# Patient Record
Sex: FEMALE | Race: WHITE | NOT HISPANIC OR LATINO | Employment: UNEMPLOYED | URBAN - METROPOLITAN AREA
[De-identification: names, ages, dates, MRNs, and addresses within clinical notes are randomized per-mention and may not be internally consistent; named-entity substitution may affect disease eponyms.]

---

## 2018-08-03 DIAGNOSIS — M25.511 RIGHT SHOULDER PAIN, UNSPECIFIED CHRONICITY: Primary | ICD-10-CM

## 2018-08-06 ENCOUNTER — HOSPITAL ENCOUNTER (OUTPATIENT)
Dept: RADIOLOGY | Facility: HOSPITAL | Age: 43
Discharge: HOME | End: 2018-08-06
Attending: ORTHOPAEDIC SURGERY
Payer: COMMERCIAL

## 2018-08-06 ENCOUNTER — OFFICE VISIT (OUTPATIENT)
Dept: ORTHOPEDICS | Facility: CLINIC | Age: 43
End: 2018-08-06
Payer: COMMERCIAL

## 2018-08-06 VITALS
HEART RATE: 71 BPM | BODY MASS INDEX: 25.83 KG/M2 | HEIGHT: 65 IN | SYSTOLIC BLOOD PRESSURE: 131 MMHG | WEIGHT: 155 LBS | DIASTOLIC BLOOD PRESSURE: 78 MMHG

## 2018-08-06 DIAGNOSIS — M25.511 RIGHT SHOULDER PAIN, UNSPECIFIED CHRONICITY: ICD-10-CM

## 2018-08-06 DIAGNOSIS — S46.211A BICEPS TENDON RUPTURE, RIGHT, INITIAL ENCOUNTER: ICD-10-CM

## 2018-08-06 DIAGNOSIS — M19.011 ARTHRITIS OF RIGHT ACROMIOCLAVICULAR JOINT: ICD-10-CM

## 2018-08-06 DIAGNOSIS — M75.111 INCOMPLETE TEAR OF RIGHT ROTATOR CUFF: Primary | ICD-10-CM

## 2018-08-06 PROBLEM — M19.019 AC (ACROMIOCLAVICULAR) JOINT ARTHRITIS: Status: ACTIVE | Noted: 2018-08-06

## 2018-08-06 PROCEDURE — 20610 DRAIN/INJ JOINT/BURSA W/O US: CPT | Mod: RT,S$GLB,, | Performed by: ORTHOPAEDIC SURGERY

## 2018-08-06 PROCEDURE — 99999 PR PBB SHADOW E&M-EST. PATIENT-LVL III: CPT | Mod: 25,PBBFAC,, | Performed by: ORTHOPAEDIC SURGERY

## 2018-08-06 PROCEDURE — 3008F BODY MASS INDEX DOCD: CPT | Mod: CPTII,S$GLB,, | Performed by: ORTHOPAEDIC SURGERY

## 2018-08-06 PROCEDURE — 73030 X-RAY EXAM OF SHOULDER: CPT | Mod: TC,FY,RT

## 2018-08-06 PROCEDURE — 99204 OFFICE O/P NEW MOD 45 MIN: CPT | Mod: 25,S$GLB,, | Performed by: ORTHOPAEDIC SURGERY

## 2018-08-06 PROCEDURE — 73030 X-RAY EXAM OF SHOULDER: CPT | Mod: 26,RT,, | Performed by: RADIOLOGY

## 2018-08-06 RX ORDER — TRIAMCINOLONE ACETONIDE 40 MG/ML
40 INJECTION, SUSPENSION INTRA-ARTICULAR; INTRAMUSCULAR
Status: DISCONTINUED | OUTPATIENT
Start: 2018-08-06 | End: 2018-08-06 | Stop reason: HOSPADM

## 2018-08-06 RX ADMIN — TRIAMCINOLONE ACETONIDE 40 MG: 40 INJECTION, SUSPENSION INTRA-ARTICULAR; INTRAMUSCULAR at 04:08

## 2018-08-06 NOTE — PROGRESS NOTES
"  Subjective:      Patient ID: Juan Carlos Diez is a 42 y.o. female.    Chief Complaint: Pain of the Right Shoulder    Referring Provider: No referring provider defined for this encounter.     HPI:  Ms. Diez is a 42-year-old right-hand-dominant female who presented today for evaluation of 1 year of right shoulder pain increasing in intensity over the last 6 months which began after she was mountain biking and hit a pothole flipping over the handlebars landing on her right shoulder.  She is uncertain of the exact date of injury.  Overhead activity and lifting items increase her pain. Rest improves it. She stated, my strength is not as good in my right shoulder as my left."  Her symptoms awaken her at night.  She has taken NSAIDs without help.  She has not done physical therapy nor had injections.    Past Medical History:   Diagnosis Date    Back injury      *  *  *  *  * L5  Anxiety  GERD  IBS  Headaches  Past history of pain management currently not attended     Past Surgical History:   Procedure Laterality Date     * TUBAL LIGATION  Ectopic pregnancy       Review of patient's allergies indicates:  No Known Allergies    Social History     Occupational History    Housekeeping     Social History Main Topics    Smoking status: Current Every Day Smoker     Packs/day: 1.00    Smokeless tobacco: Not on file    Alcohol use Yes    Drug use: No    Sexual activity: Yes     Birth control/ protection: Surgical      Family History   Problem Relation Age of Onset    Arthritis Mother     Heart disease Father     Hypertension Father     No Known Problems Sister     Ulcers Brother     Arthritis Sister     No Known Problems Sister     Brain cancer Sister     No Known Problems Brother     No Known Problems Brother      Previous Hospitalizations:  Childbirth, ectopic pregnancy.    Review of Systems   Constitution: Negative for chills and fever.   HENT: Negative for hearing loss and sore throat.    Eyes: Negative for " blurred vision.   Cardiovascular: Negative for chest pain.   Respiratory: Negative for shortness of breath.    Endocrine: Negative for polydipsia.   Hematologic/Lymphatic: Does not bruise/bleed easily.   Skin: Negative for poor wound healing.   Musculoskeletal: Positive for joint pain.   Gastrointestinal: Negative for constipation and diarrhea.   Genitourinary: Negative for flank pain.   Neurological: Negative for numbness.   Psychiatric/Behavioral: The patient is nervous/anxious.    Allergic/Immunologic: Negative for environmental allergies.          Objective:      Physical Exam:  General: AAOx3.  No acute distress  HEENT: Normocephalic, PEARLA EOMI, Fair Dentition  Neck: Supple, No JVD  Chest: Symetric, equal excursion on inspiration  Abdomen: Soft NTND  Vascular:  Pulses intact and equal bilaterally.  Capillary refill less than 3 seconds and equal bilaterally  Neurologic:  Pinprick and soft touch intact and equal bilaterally  Integment:  No ecchymosis, no errythema  Extremity:  Shoulder:  Forward flexion/abduction equal bilaterally 0/175°.  Internal rotation right shoulder L5, left shoulder T9.  Negative lift-off bilaterally. External rotation equal bilaterally 0/25°.  Tender with motion right shoulder.  Full can negative both shoulders.  Empty can negative both shoulders.  Hedrick/Neer positive right shoulder.  Cross-arm positive right shoulder.  Mild elevation AC joint right shoulder.  Tender with palpation AC joint right shoulder.  Tender in the bicipital groove right shoulder.  Yergason's negative both shoulders.  Apprehension/relocation negative both shoulders.  Positive distal bre sign right shoulder.   Radiography:  Personally  reviewed x-rays of the right shoulder completed on 08/06/2018 which showed a type 1/2 AC separation, type 2 acromion, no fracture dislocation.      Assessment:       Impression:     1. Incomplete rotator cuff tear versus impingement, right shoulder.   2. Biceps tendon rupture,  right shoulder, initial encounter    3. AC arthritis/chronic AC separation, right shoulder.         Plan:       1.  Discussed physical examination and radiographic findings with the patient. Juan Carlos understands that she has multiple issues present in her right shoulder but she may improve with conservative management and recommend she trial more conservative care before proceeding with surgery. She understands if she fails conservative care she can consider surgical intervention in the future.    2.  Offered a steroid injection to the right shoulder, she elected to proceed.  3.  Offered referred to physical therapy, declined for now as she may proceed with surgery in the future and would prefer to save it for postoperative.  4.  Home exercises to include Codman exercises, wall walking exercises, towel exercises, and cane exercises were shown discussed with the patient.   5.  NSAID use was discussed with the patient.  6.  May use right upper extremity as tolerated but avoid inciting activities.  7.  Follow up p.r.n..  If the patient has not improved by next visit we will discuss possible surgical intervention and refer for an MRI of her right shoulder.

## 2018-08-06 NOTE — PROCEDURES
Large Joint Aspiration/Injection  Date/Time: 8/6/2018 4:12 PM  Performed by: VALERI ESCOBAR  Authorized by: VALERI ESCOBAR     Consent Done?:  Yes (Verbal)  Indications:  Pain and diagnostic evaluation  Procedure site marked: Yes    Timeout: Prior to procedure the correct patient, procedure, and site was verified      Location:  Shoulder  Site:  R glenohumeral  Prep: Patient was prepped and draped in usual sterile fashion    Ultrasonic Guidance for needle placement: No  Needle size:  22 G  Approach:  Posterior  Medications:  40 mg triamcinolone acetonide 40 mg/mL  Patient tolerance:  Patient tolerated the procedure well with no immediate complications

## 2019-01-28 ENCOUNTER — OFFICE VISIT (OUTPATIENT)
Dept: ORTHOPEDICS | Facility: CLINIC | Age: 44
End: 2019-01-28
Payer: COMMERCIAL

## 2019-01-28 VITALS
HEIGHT: 65 IN | BODY MASS INDEX: 25.83 KG/M2 | SYSTOLIC BLOOD PRESSURE: 130 MMHG | DIASTOLIC BLOOD PRESSURE: 75 MMHG | HEART RATE: 80 BPM | WEIGHT: 155 LBS

## 2019-01-28 DIAGNOSIS — M19.011 ARTHRITIS OF RIGHT ACROMIOCLAVICULAR JOINT: ICD-10-CM

## 2019-01-28 DIAGNOSIS — M75.101 TEAR OF RIGHT ROTATOR CUFF, UNSPECIFIED TEAR EXTENT: Primary | ICD-10-CM

## 2019-01-28 DIAGNOSIS — S46.211D BICEPS TENDON RUPTURE, RIGHT, SUBSEQUENT ENCOUNTER: ICD-10-CM

## 2019-01-28 DIAGNOSIS — M75.111 INCOMPLETE RUPTURE OF RIGHT ROTATOR CUFF: Primary | ICD-10-CM

## 2019-01-28 PROCEDURE — 99999 PR PBB SHADOW E&M-EST. PATIENT-LVL III: ICD-10-PCS | Mod: PBBFAC,,, | Performed by: ORTHOPAEDIC SURGERY

## 2019-01-28 PROCEDURE — 99999 PR PBB SHADOW E&M-EST. PATIENT-LVL III: CPT | Mod: PBBFAC,,, | Performed by: ORTHOPAEDIC SURGERY

## 2019-01-28 PROCEDURE — 99214 PR OFFICE/OUTPT VISIT, EST, LEVL IV, 30-39 MIN: ICD-10-PCS | Mod: S$PBB,,, | Performed by: ORTHOPAEDIC SURGERY

## 2019-01-28 PROCEDURE — 99214 OFFICE O/P EST MOD 30 MIN: CPT | Mod: S$PBB,,, | Performed by: ORTHOPAEDIC SURGERY

## 2019-01-28 NOTE — PROGRESS NOTES
Subjective:      Patient ID: Juan Carlos Diez is a 43 y.o. female.    Chief Complaint: Pain of the Right Shoulder    HPI:  Ms. Diez returns today with complaints of persistent pain in her right shoulder.  She was last seen on 08/06/2018 and was given a steroid injection in her shoulder which she stated did not help.  She stated she would have come sooner but she was having trouble with transportation.  She now has transportation to get tip area she would like.  Forward flexion and abduction along with lifting increase her symptoms. Overhead activities increase her symptoms. Her symptoms still awaken her at night.  She has taken NSAIDs without help.  She has not done recent physical therapy.  The steroid injection given at her last visit did not help.  She originally injured her shoulder in 2016 after she hit a pothole while mountain biking and fell onto her right shoulder.    ROS:  No new diagnosis/surgery/prescriptions since last office visit on 08/06/2018.  Constitution: Negative for chills and fever.   HENT: Negative for hearing loss and sore throat.    Eyes: Negative for blurred vision.   Cardiovascular: Negative for chest pain.   Respiratory: Negative for shortness of breath.    Endocrine: Negative for polydipsia.   Hematologic/Lymphatic: Does not bruise/bleed easily.   Skin: Negative for poor wound healing.   Musculoskeletal: Positive for joint pain.   Gastrointestinal: Negative for constipation and diarrhea.   Genitourinary: Negative for flank pain.   Neurological: Negative for numbness.   Psychiatric/Behavioral: The patient is nervous/anxious.    Allergic/Immunologic: Negative for environmental allergies.       Objective:      Physical Exam:   General: AAOx3.  No acute distress  Vascular:  Pulses intact and equal bilaterally.  Capillary refill less than 3 seconds and equal bilaterally  Neurologic:  Pinprick and soft touch intact and equal bilaterally. Spurling's negative.  Integment:  No ecchymosis, no  errythema  Extremity:  Shoulder:  Forward flexion/abduction equal bilaterally 0/175°.  Internal rotation right shoulder L5, left shoulder T9.  Negative lift-off bilaterally. External rotation equal bilaterally 0/25°. Tender with motion right shoulder.  Full can negative both shoulders.  Empty can negative both shoulders.  Hedrick/Neer positive right shoulder.  Cross-arm positive right shoulder.  Mild elevation AC joint right shoulder.  Tender with palpation AC joint right shoulder.  Tender in the bicipital groove right shoulder.  Yergason's negative both shoulders.  Apprehension/relocation negative both shoulders.  Positive distal bre sign right shoulder                      C-spine:  Flexion/extension 80/80 degrees. Right/left rotation 90/90 degrees. Right/left side bending 60/60 degrees. Nontender with C-spine motion.  Nontender with palpation C-spine.  Radiography:  No new x-rays done today.      Assessment:       Impression:   1. Incomplete rotator cuff tear versus impingement, right shoulder.   2. Biceps tendon rupture, right shoulder, initial encounter    3. AC arthritis/chronic AC separation, right shoulder         Plan:       1.  Discussed physical examination with the patient. She understands she has an incomplete rotator cuff tear and a biceps tendon rupture.  She also has AC arthritis. At this point since she has failed conservative management she can either continue with conservative management or consider surgical intervention. She stated she would prefer to proceed with surgery. She understands before surgery can be performed she will need an MRI of her shoulder.  2.  Refer for an MRI of the right shoulder.  3.  Final recommendations after MRI is completed.  4.  Continue with NSAIDs as tolerated allowed by PCM.  5.  Continue with home exercises previously discussed.  6.  Portal used discussed in detail with the patient. Information on the [facility] portal was given to the patient.  The patient was  encouraged to use the [facility] portal for all future encounters.  7.  Follow up after MRI is completed

## 2019-02-12 ENCOUNTER — TELEPHONE (OUTPATIENT)
Dept: ORTHOPEDICS | Facility: CLINIC | Age: 44
End: 2019-02-12

## 2019-02-12 NOTE — TELEPHONE ENCOUNTER
Called patient to ask her to apply for financial assistance to have her MRI completed. Patient notified and given phone number to [facility].

## 2019-02-15 ENCOUNTER — TELEPHONE (OUTPATIENT)
Dept: ORTHOPEDICS | Facility: CLINIC | Age: 44
End: 2019-02-15

## 2019-02-15 NOTE — TELEPHONE ENCOUNTER
Called patient to reschedule her MRI Results appointment with Dr. Ramesh because she rescheduled her MRI for 02/20/2019. No answer at phone number [phone] and unable to leave message with patient because her voicemail box is full.

## 2019-03-14 ENCOUNTER — TELEPHONE (OUTPATIENT)
Dept: ORTHOPEDICS | Facility: CLINIC | Age: 44
End: 2019-03-14

## 2019-03-14 NOTE — TELEPHONE ENCOUNTER
Patient is scheduled with Dr. Ramesh for MRI results but patient canceled her MRI appointment and did not reschedule. Patient's phone number [phone] belongs to patient's . Was given patient's phone number [phone]. No answer at this phone number and left voicemail for patient to call office.

## 2020-09-14 ENCOUNTER — HOSPITAL ENCOUNTER (EMERGENCY)
Facility: HOSPITAL | Age: 45
Discharge: HOME | End: 2020-09-14
Attending: EMERGENCY MEDICINE

## 2020-09-14 VITALS
DIASTOLIC BLOOD PRESSURE: 62 MMHG | HEART RATE: 87 BPM | SYSTOLIC BLOOD PRESSURE: 116 MMHG | HEIGHT: 67 IN | WEIGHT: 145 LBS | OXYGEN SATURATION: 98 % | RESPIRATION RATE: 20 BRPM | TEMPERATURE: 99 F | BODY MASS INDEX: 22.76 KG/M2

## 2020-09-14 DIAGNOSIS — M79.89 BILATERAL HAND SWELLING: ICD-10-CM

## 2020-09-14 DIAGNOSIS — S60.449A TIGHT RING ON FINGER: Primary | ICD-10-CM
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
## 2020-09-14 PROCEDURE — 99281 EMR DPT VST MAYX REQ PHY/QHP: CPT

## 2020-09-14 NOTE — DISCHARGE INSTRUCTIONS
Return to ER for worsening of symptoms.    Download the [employer] isaac to access your health records & test results.  Please remember that you had a visit to the emergency room today and this does not substitute as primary care services for ongoing management because emergency services is a snap shot in time.  Should you have any worsening condition that requires emergency services do not hesitate to return to the ER.    COVID-19 TESTING  Hot Line [phone]  [address] [place] [address]  [facility]  Hours: 8am-5pm Monday - Friday   8am-noon Saturday - Sunday

## 2020-09-14 NOTE — ED NOTES
"Unable to remove ring from L ring finger d/t hardened material, pt requests discharge home into self care. The ring is not obstructing blood flow, no discoloration noted, generalized hand swelling that pt reports as recurrent "Happens sometimes after working, has happened for years." Pt to discharge home per D [place] SHETH, f/u with PCP if needed and informed to return to ER for any new or worsening of symptoms.  "

## 2020-09-14 NOTE — ED PROVIDER NOTES
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 No  was used.     Review of patient's allergies indicates:  No Known Allergies  Past Medical History:   Diagnosis Date    Back injury     L5     Past Surgical History:   Procedure Laterality Date    TUBAL LIGATION       Family History   Problem Relation Age of Onset    Arthritis Mother     Heart disease Father     Hypertension Father     No Known Problems Sister     Ulcers Brother     Arthritis Sister     No Known Problems Sister     Brain cancer Sister     No Known Problems Brother     No Known Problems Brother      Social History     Tobacco Use    Smoking status: Current Every Day Smoker     Packs/day: 1.00    Smokeless tobacco: Never Used   Substance Use Topics    Alcohol use: Yes    Drug use: No     Review of Systems   Constitutional: Negative for fever.   HENT: Negative for sore throat.    Respiratory: Negative for shortness of breath.    Cardiovascular: Negative for chest pain.   Gastrointestinal: Negative for nausea.   Genitourinary: Negative for dysuria.   Musculoskeletal: Negative for arthralgias, back pain and myalgias.        Swelling of bilateral hands   Skin: Negative for color change, rash and wound.    Neurological: Negative for weakness.   Hematological: Does not bruise/bleed easily.   All other systems reviewed and are negative.      Physical Exam     Initial Vitals [09/14/20 1322]   BP Pulse Resp Temp SpO2   116/62 87 20 98.7 °F (37.1 °C) 98 %      MAP       --         Physical Exam    Nursing note and vitals reviewed.  Constitutional: She appears well-developed. She does not appear ill. No distress.   Afebrile, vital signs stable   HENT:   Head: Normocephalic and atraumatic.   Right Ear: External ear normal.   Left Ear: External ear normal.   Nose: Nose normal.   Eyes: Lids are normal.   Neck: Neck supple.   Cardiovascular: Normal rate.   Pulses:       Radial pulses are 2+ on the right side and 2+ on the left side.   Pulmonary/Chest: Effort normal and breath sounds normal. No respiratory distress.   Abdominal: She exhibits no distension.   Musculoskeletal:        Right hand: She exhibits swelling.        Left hand: She exhibits swelling.        Hands:    Neurological: She is alert.   Skin: Skin is warm. Capillary refill takes less than 2 seconds. No rash noted.   Psychiatric: She has a normal mood and affect.         ED Course   Procedures  Labs Reviewed - No data to display       Imaging Results    None          Medical Decision Making:   ED Management:  We were unsuccessful with multiple modalities to removed patient's ring, she is desiring for us to discontinue attempts opting to discharge home, patient has no restriction of blood flow to finger at this time so we are okay with this presently    Findings, diagnosis & plan of care discussed with patient:  Bilateral hand swelling, tight ring on finger; care instructions given -- further instructions given to follow-up with PCP for any further concerns    All questions answered, strict return precautions given, patient verbalized understanding to all instructions, pleasant visit -- vital signs are stable & patient is in no distress at  discharge    Disclaimer:  This note was prepared with [employer] voice recognition transcription software. Garbled syntax, mangled pronouns, and other bizarre constructions may be attributed to that software system.  Should there be any questions do not hesitate to contact me for clarification.                               Clinical Impression:       ICD-10-CM ICD-9-CM   1. Tight ring on finger  S60.449A 915.8    W49.04XA E928.5   2. Bilateral hand swelling  M79.89 729.81             ED Disposition Condition    Discharge Stable        ED Prescriptions     None        Follow-up Information     Follow up With Specialties Details Why Contact Info    Primary care provider  Go to  For any further concerns                                        Ashu Reynoso NP  09/14/20 1847

## 2024-09-25 ENCOUNTER — NURSE TRIAGE (OUTPATIENT)
Dept: ADMINISTRATIVE | Facility: CLINIC | Age: 49
End: 2024-09-25
Payer: COMMERCIAL

## 2024-09-25 NOTE — TELEPHONE ENCOUNTER
Pt reports 10/10 severe pain to bilateral hands and arms that began 1 week ago. Pt also reports numbness/tingling sensation to BUE and edema to bilateral hands. Denies SOB, chest pain, dizziness. Care advice to go to ED now. Pt verbalizes understanding.      Reason for Disposition   Age > 40 and no obvious cause for pain, pain still present even when not moving the arm    Additional Information   Negative: Shock suspected (e.g., cold/pale/clammy skin, too weak to stand, low BP, rapid pulse)   Negative: Similar pain previously and it was from 'heart attack'   Negative: Similar pain previously from 'angina' and not relieved by nitroglycerin   Negative: Sounds like a life-threatening emergency to the triager   Negative: Difficulty breathing or unusual sweating (e.g., sweating without exertion)   Negative: Chest pain lasting longer than 5 minutes    Protocols used: Arm Pain-A-OH